# Patient Record
Sex: FEMALE | Race: WHITE | NOT HISPANIC OR LATINO | URBAN - METROPOLITAN AREA
[De-identification: names, ages, dates, MRNs, and addresses within clinical notes are randomized per-mention and may not be internally consistent; named-entity substitution may affect disease eponyms.]

---

## 2017-09-14 ENCOUNTER — IMPORTED ENCOUNTER (OUTPATIENT)
Dept: URBAN - METROPOLITAN AREA CLINIC 1 | Facility: CLINIC | Age: 77
End: 2017-09-14

## 2017-09-14 PROBLEM — H40.1133: Noted: 2017-09-14

## 2017-09-14 PROBLEM — Z96.1: Noted: 2017-09-14

## 2017-09-14 PROBLEM — H02.423: Noted: 2017-09-14

## 2017-09-14 PROBLEM — H16.143: Noted: 2017-09-14

## 2017-09-14 PROBLEM — H04.123: Noted: 2017-09-14

## 2017-09-14 PROCEDURE — 92012 INTRM OPH EXAM EST PATIENT: CPT

## 2017-09-14 PROCEDURE — 92083 EXTENDED VISUAL FIELD XM: CPT

## 2017-09-14 NOTE — PATIENT DISCUSSION
1.  Adv POAG OU (CD 0.9 OU) with Contact dermatits OU resolved off Alphagan. **Allergic to ALphagan P** Stable IOP OU. HVF defects show no progression OU. Consider ALT if IOP elevates. (H/o Sulfa reaction/ allergy) Cont Timolol 0.5% BID OU Cont Latanoprost QHS OU. 2.  STERLING w/ PEK OU- Cont ATs QID OU Routinely. 3.  Pseudophakia OU - (Standard) 4. Myogenic Ptosis OU -- observe 5. H/o Uveitis OU (H/o MARIE with unknown etiology)- Quiet off drops. 6.  H/o ERM OD7. Return for an appointment for a 30/OCT in April with Dr. Zo Da Silva.

## 2017-09-25 ENCOUNTER — HOSPITAL ENCOUNTER (OUTPATIENT)
Dept: PHYSICAL THERAPY | Age: 77
Discharge: HOME | End: 2017-09-25
Payer: MEDICARE

## 2017-09-25 PROCEDURE — G8979 MOBILITY GOAL STATUS: HCPCS

## 2017-09-25 PROCEDURE — 97162 PT EVAL MOD COMPLEX 30 MIN: CPT

## 2017-09-25 PROCEDURE — G8978 MOBILITY CURRENT STATUS: HCPCS

## 2017-09-25 PROCEDURE — 97110 THERAPEUTIC EXERCISES: CPT

## 2017-09-25 NOTE — PROGRESS NOTES
[address] [facility] [address] [facility] AT [address] [place], [place]  Phone: [phone] Fax: [phone] / [address] [address] PHYSICAL THERAPY SERVICES  Patient Name: Lizy : 1940   Medical   Diagnosis: Unsteadiness on feet [R26.81]  Right knee pain [M25.561] Treatment Diagnosis: R knee pain, gait imbalance    Onset Date: 17     Referral Source: Shay Interiano MD Start of Care [facility]): 2017   Prior Hospitalization: See medical history Provider #: [id number]   Prior Level of Function: Pt notes unsteadiness for several years, decreased gait speed   Comorbidities: Hx of L patellar fx    Medications: Verified on Patient Summary List   The Plan of Care and following information is based on the information from the initial evaluation.   ========================================================================  Assessment / key information: Pt is a 77 yo female who tripped descenting stairs, hurting her head, R toes and R knee. Pt presents with medial and posterior knee knee pain, worse in the morning and better with mobility. Pain ranges from 0-2/10. MD notes Xrays for foot and knee are (-) for fx but + for arthritis. Functional Limitations include long duration standing, walking; walking faster, walking in the dark, I ambulation on stairs; decreased strength leading to confidence issues/walking/exercise, squatting; showering I (uses bath or shower seat). Pt notes furniture walking/significant near falls at home. Palpation reveals TTP distal HS. Gait: is slow, unsteady, guarded; normal mechnics. Knee AROM R 0 to 135; . Ankle AROM DF: 7.  LE MMT: hip flex R 4-/5, abd 4/5 (B), ext 4/5, knee flex 5/5, ext 5/5, ankle 5/5. Patellar mobility is WNL. HS 90/90 R 30, , [place] min + R.  SLS EO 3\". Tandem (L) EO: 30\", EC 3\", R EO: 30, EC 2\". Rhomb EO/EC: 60\" each.  Pt will benefit from PT interventions to address the aforementioned deficits and allow pt to return to PLOF.    ========================================================================  Eval Complexity: History: MEDIUM  Complexity : 1-2 comorbidities / personal factors will impact the outcome/ POC Exam:HIGH Complexity : 4+ Standardized tests and measures addressing body structure, function, activity limitation and / or participation in recreation  Presentation: MEDIUM Complexity : Evolving with changing characteristics  Clinical Decision Making:MEDIUM Complexity : FOTO score of 26-74Overall Complexity:MEDIUM  Problem List: pain affecting function, decrease ROM, decrease strength, impaired gait/ balance, decrease ADL/ functional abilitiies, decrease activity tolerance, decrease flexibility/ joint mobility and decrease transfer abilities   Treatment Plan may include any combination of the following: Therapeutic exercise, Therapeutic activities, Neuromuscular re-education, Physical agent/modality, Gait/balance training, Manual therapy, Patient education, Self Care training, Functional mobility training, Home safety training and Stair training  Patient / Family readiness to learn indicated by: asking questions, trying to perform skills and interest  Persons(s) to be included in education: patient (P)  Barriers to Learning/Limitations: None  Measures taken:    Patient Goal (s): \"get safer\"   Patient self reported health status: good  Rehabilitation Potential: good   Short Term Goals: To be accomplished in  2  treatments:  1. Pt will be independent and compliant with HEP to decrease pain, increase ROM and return pt to PLOF.  Long Term Goals: To be accomplished in  8-10  treatments:  1. Pt will increase score on the FOTO to > or = 64/100 to demo an increase in functional tolerance  2. Pt will decrease time on the TUG to < or = 7\" to improve gait safety  3.  Pt will be able to perform SLS with EO > or = 7\" with normal sway to improve community mobility and curb negotiation  4. Pt will have sufficient LE strength to squat and  a grocery bag from the floor and return to standing without pain or safety concerns. Frequency / Duration:   Patient to be seen  2  times per week for 8-10  treatments:  Patient / Caregiver education and instruction: self care, activity modification and exercises  G-Codes (GP): Mobility [id number] Current  CK= 40-59%   Goal  CJ= 20-39%. The severity rating is based on the FOTO Score    Therapist Signature: Yancy Monaco DPT Date: 8/08/0358   Certification Period: 9/25/17 to 12/23/17 Time: 9:32 AM   ========================================================================  I certify that the above Physical Therapy Services are being furnished while the patient is under my care. I agree with the treatment plan and certify that this therapy is necessary. Physician Signature:        Date:       Time:   Please sign and return to [facility] or you may fax the signed copy to [phone]. Thank you.

## 2017-09-25 NOTE — PROGRESS NOTES
PT DAILY TREATMENT NOTE     Patient Name: Lizy  Date:2017  : 1940  [x]  Patient  Verified  [place]or: [place] MED[place] / Plan: [place] MED[place] & [place] / Product Type: Medicare /    In time:9:33  Out time:10:20  Total Treatment Time (min): 47  Total Timed Codes (min): 15  1:1 Treatment Time (min): 52   Visit #: 1 of 8-10    Treatment Area: Unsteadiness on feet [R26.81]  Right knee pain [M25.561]    SUBJECTIVE  Pain Level (0-10 scale): 0  Any medication changes, allergies to medications, adverse drug reactions, diagnosis change, or new procedure performed?: [x] No    [] Yes (see summary sheet for update)  Subjective functional status/changes:   [] No changes reported  SEE IE for Subjective Information    OBJECTIVE      15 min Therapeutic Exercise:  [x] See flow sheet :   Rationale: increase ROM, increase strength, improve coordination and improve balance to improve the patients ability to improve gait, mobility,           x min Patient Education: [x] Review HEP    [] Progressed/Changed HEP based on:   [] positioning   [] body mechanics   [] transfers   [] heat/ice application        Other Objective/Functional Measures:   Coordination of LEs intact  senation intact      Pain Level (0-10 scale) post treatment: 0    ASSESSMENT/Changes in Function: see IE    Patient will continue to benefit from skilled PT services to modify and progress therapeutic interventions, address functional mobility deficits, address ROM deficits, address strength deficits, analyze and address soft tissue restrictions, analyze and cue movement patterns, analyze and modify body mechanics/ergonomics, assess and modify postural abnormalities and address imbalance/dizziness to attain remaining goals.      [x]  See Plan of Care  []  See progress note/recertification  []  See Discharge Summary         Progress towards goals / Updated goals:  SEE IE FOR GOALS     PLAN  []  Upgrade activities as tolerated     []  Continue plan of care  []  Update interventions per flow sheet       []  Discharge due to:_  [x]  Other:Initiate POC as stated in the IE      Justification for Eval Code Complexity:  Patient History : fear avoidance, chronic decline of balance  Examination balance, TUG 9\", CROFT 18/30, decreased balance, decreased strength   Clinical Presentation: evolving with changing  Clinical Decision Making : BETTINA 54/100     Danilo Barrett DPT 9/25/2017  9:32 AM

## 2017-09-29 ENCOUNTER — HOSPITAL ENCOUNTER (OUTPATIENT)
Dept: PHYSICAL THERAPY | Age: 77
Discharge: HOME | End: 2017-09-29
Payer: MEDICARE

## 2017-09-29 PROCEDURE — 97110 THERAPEUTIC EXERCISES: CPT

## 2017-09-29 PROCEDURE — 97116 GAIT TRAINING THERAPY: CPT

## 2017-09-29 NOTE — PROGRESS NOTES
PT DAILY TREATMENT NOTE     Patient Name: Lizy  Date:2017  : 1940  [x]  Patient  Verified  Payor: [employer]u [employer] / Plan: [place] MED[place] PART [place] & B / Product Type: Medicare /    In time:722  Out time:803  Total Treatment Time (min): 41  Total Timed Codes (min): 41  1:1 Treatment Time (min): 41   Visit #: 2 of 8-10    Treatment Area: Unsteadiness on feet [R26.81]  Right knee pain [M25.561]    SUBJECTIVE  Pain Level (0-10 scale): 0  Any medication changes, allergies to medications, adverse drug reactions, diagnosis change, or new procedure performed?: [x] No    [] Yes (see summary sheet for update)  Subjective functional status/changes:   [] No changes reported  \"Doing ok. Its early. \"    OBJECTIVE  Modality rationale: NI today    Min Type Additional Details    [] Estim: []Att   []Unatt        []TENS instruct                  []IFC  []Premod   []NMES                     []Other:  []w/US   []w/ice   []w/heat  Position:  Location:    []  Traction: [] Cervical       []Lumbar                       [] Prone          []Supine                       []Intermittent   []Continuous Lbs:  [] before manual  [] after manual    []  Ultrasound: []Continuous   [] Pulsed                           []1MHz   []3MHz Location:  W/cm2:    []  Iontophoresis with dexamethasone         Location: [] Take home patch   [] In clinic    []  Ice     []  heat  []  Ice massage Position:  Location:    []  Vasopneumatic Device Pressure:       [] lo [] med [] hi   Temperature: [] lo [] med [] hi   [x] Skin assessment post-treatment:  [x]intact []redness- no adverse reaction       []redness  adverse reaction:       26 min Therapeutic Exercise:  [x] See flow sheet :Initiated ther ex per flow sheet    Rationale: increase ROM, increase strength, improve coordination, improve balance and decrease gait deviations to improve the patients ability to improve confidence with gait and balance to decrease fall risk     15 min Gait training; EC, retro, side step and tandem in ll bars   Rationale: improve  balance to decrease fall risk in the home and community     x min Patient Education: [x] Review HEP from IE        Other Objective/Functional Measures: Therex initiated today - first FU post eval   EC gait deviation approx 1.5 ft to R after 20 ft x 2    Pain Level (0-10 scale) post treatment: 0    ASSESSMENT/Changes in Function: Patient tolerated initiation of therex well. 100% VC for form and technique for all newly introduced therex. SBA for balance challenges. Most unsteady with EC, HT and nods, but improved with practice. Unable to raheem SR EC, therefore mod to bunion with EC. Patient will continue to benefit from skilled PT services to modify and progress therapeutic interventions, address functional mobility deficits, address ROM deficits, address strength deficits, analyze and address soft tissue restrictions, analyze and cue movement patterns, analyze and modify body mechanics/ergonomics and assess and modify postural abnormalities to attain remaining goals.      [x]  See Plan of Care  []  See progress note/recertification  []  See Discharge Summary         Progress towards goals / Updated goals:  FIRST FU TREATMENT - CONT PER POT TO EST GOALS 9/29/2017    PLAN  [x]  Upgrade activities as tolerated     []  Continue plan of care  [x]  Update interventions per flow sheet       []  Discharge due to:_  [x]  Other:_assess response to first FU treatment     Progress side step to [id number], PT 9/29/2017

## 2017-10-03 ENCOUNTER — HOSPITAL ENCOUNTER (OUTPATIENT)
Dept: PHYSICAL THERAPY | Age: 77
Discharge: HOME | End: 2017-10-03
Payer: MEDICARE

## 2017-10-03 PROCEDURE — 97116 GAIT TRAINING THERAPY: CPT

## 2017-10-03 PROCEDURE — 97110 THERAPEUTIC EXERCISES: CPT

## 2017-10-03 NOTE — PROGRESS NOTES
PT DAILY TREATMENT NOTE     Patient Name: Lizy  Date:10/3/2017  : 1940  [x]  Patient  Verified  Payor: [place] MEDICARE / Plan: [place] MEDICARE PART A & B / Product Type: Medicare /    In time: 8:30am      Out time: 9:11am  Total Treatment Time (min): 41  Total Timed Codes (min): 41  1:1 Treatment Time (min): 30  Visit #: 3 of 8-10    Treatment Area: Unsteadiness on feet [R26.81]  Right knee pain [M25.561]    SUBJECTIVE  Pain Level (0-10 scale): 0  Any medication changes, allergies to medications, adverse drug reactions, diagnosis change, or new procedure performed?: [x] No    [] Yes (see summary sheet for update)  Subjective functional status/changes:   [] No changes reported  Patient notes first f/u treatment did not produce any adverse reaction. \"My knee really only hurts when I go up the stairs. \"    OBJECTIVE  Modality rationale: NI today    Min Type Additional Details    [] Estim: []Att   []Unatt        []TENS instruct                  []IFC  []Premod   []NMES                     []Other:  []w/US   []w/ice   []w/heat  Position:  Location:    []  Traction: [] Cervical       []Lumbar                       [] Prone          []Supine                       []Intermittent   []Continuous Lbs:  [] before manual  [] after manual    []  Ultrasound: []Continuous   [] Pulsed                           []1MHz   []3MHz Location:  W/cm2:    []  Iontophoresis with dexamethasone         Location: [] Take home patch   [] In clinic    []  Ice     []  heat  []  Ice massage Position:  Location:    []  Vasopneumatic Device Pressure:       [] lo [] med [] hi   Temperature: [] lo [] med [] hi   [x] Skin assessment post-treatment:  [x]intact []redness- no adverse reaction       []redness  adverse reaction:     23 min Therapeutic Exercise:  [x] See flow sheet: added DL leg press   Rationale: increase ROM, increase strength, improve coordination, improve balance and decrease gait deviations to improve the patients ability to improve confidence with gait and balance to decrease fall risk     18 min Gait training: EC, retro, carioca and tandem in clinic, gait belt used for safety, 2 x 30 feet each, SBA req   Rationale: improve  balance to decrease fall risk in the home and community     X min Patient Education: [x] Review HEP - progress to MSR GT EO, add std hip ABD with RTB      Other Objective/Functional Measures:      Pain Level (0-10 scale) post treatment: 0    ASSESSMENT/Changes in Function:   Good tolerance to treatment today. Dynamic balance improved as pt notes ability to maintain level head and demo GT challenges with only 2 LOB during tandem only. Patient will continue to benefit from skilled PT services to modify and progress therapeutic interventions, address functional mobility deficits, address ROM deficits, address strength deficits, analyze and address soft tissue restrictions, analyze and cue movement patterns, analyze and modify body mechanics/ergonomics and assess and modify postural abnormalities to attain remaining goals. [x]  See Plan of Care  []  See progress note/recertification  []  See Discharge Summary         Progress towards goals / Updated goals:  Short Term Goals: To be accomplished in  2  treatments:  1. Pt will be independent and compliant with HEP to decrease pain, increase ROM and return pt to PLOF. Long Term Goals: To be accomplished in  8-10  treatments:  1. Pt will increase score on the FOTO to > or = 64/100 to demo an increase in functional tolerance  2. Pt will decrease time on the TUG to < or = 7\" to improve gait safety  3. Pt will be able to perform SLS with EO > or = 7\" with normal sway to improve community mobility and curb negotiation  4. Pt will have sufficient LE strength to squat and  a grocery bag from the floor and return to standing without pain or safety concerns.      PLAN  [x]  Upgrade activities as tolerated     [x]  Continue plan of care  [x]  Update interventions per flow sheet       []  Discharge due to:_  []  Other:_    Estefania Alberto PTA 10/3/2017

## 2017-10-05 NOTE — PROGRESS NOTES
PT DAILY TREATMENT NOTE     Patient Name: Lizy  Date:10/5/2017  : 1940  [x]  Patient  Verified  Payor: Harika Landaverde / Plan: [place] MEDICARE PART A & B / Product Type: Medicare /    In time: 9:31am      Out time: 10:12am  Total Treatment Time (min): 41  Total Timed Codes (min): 41  1:1 Treatment Time (min): 38  Visit #: 4 of 8-10    Treatment Area: Unsteadiness on feet [R26.81]  Right knee pain [M25.561]    SUBJECTIVE  Pain Level (0-10 scale): 0  Any medication changes, allergies to medications, adverse drug reactions, diagnosis change, or new procedure performed?: [x] No    [] Yes (see summary sheet for update)  Subjective functional status/changes:   [] No changes reported  Pt notes near falls daily. \"I walked yesterday over a mile. I have to stare at the floor when I walk so I don't fall. \"    OBJECTIVE  Modality rationale: NT   Min Type Additional Details    [] Estim: []Att   []Unatt        []TENS instruct                  []IFC  []Premod   []NMES                     []Other:  []w/US   []w/ice   []w/heat  Position:  Location:    []  Traction: [] Cervical       []Lumbar                       [] Prone          []Supine                       []Intermittent   []Continuous Lbs:  [] before manual  [] after manual    []  Ultrasound: []Continuous   [] Pulsed                           []1MHz   []3MHz Location:  W/cm2:    []  Iontophoresis with dexamethasone         Location: [] Take home patch   [] In clinic    []  Ice     []  heat  []  Ice massage Position:   Location:     []  Vasopneumatic Device Pressure:       [] lo [] med [] hi   Temperature: [] lo [] med [] hi   [x] Skin assessment post-treatment:  [x]intact []redness- no adverse reaction       []redness  adverse reaction:     25 min Therapeutic Exercise:  [x] See flow sheet:   Rationale: increase ROM, increase strength, improve coordination, improve balance and decrease gait deviations to improve the patients ability to improve confidence with gait and balance to decrease fall risk     16 min Gait training: EC, retro, carioca and tandem in clinic, gait belt used for safety, 2 x 30 feet each, SBA req; outdoor amb on grass, on/off curb, over parking block, on mulch, slanted sidewalk x 300 feet   Rationale: improve  balance to decrease fall risk in the home and community     X min Patient Education: [x] Review HEP     Other Objective/Functional Measures:      Pain Level (0-10 scale) post treatment: 0    ASSESSMENT/Changes in Function:   Anterior/flex hip strategy beginning to develop for posterior perturbations/challenges and pt notes this is her greatest challenge. Patient notes no falls since last session, but residual pain along distal quads - PTA and pt determined DOMS versus actual pain. Pt able to demo safe community amb, but notes staring at floor due to lack of confidence with balance. Patient will continue to benefit from skilled PT services to modify and progress therapeutic interventions, address functional mobility deficits, address ROM deficits, address strength deficits, analyze and address soft tissue restrictions, analyze and cue movement patterns, analyze and modify body mechanics/ergonomics and assess and modify postural abnormalities to attain remaining goals. [x]  See Plan of Care  []  See progress note/recertification  []  See Discharge Summary         Progress towards goals / Updated goals:  Short Term Goals: To be accomplished in  2  treatments:  1. Pt will be independent and compliant with HEP to decrease pain, increase ROM and return pt to PLOF. Long Term Goals: To be accomplished in  8-10  treatments:  1. Pt will increase score on the FOTO to > or = 64/100 to demo an increase in functional tolerance  2. Pt will decrease time on the TUG to < or = 7\" to improve gait safety  3. Pt will be able to perform SLS with EO > or = 7\" with normal sway to improve community mobility and curb negotiation  4.  Pt will have sufficient LE strength to squat and  a grocery bag from the floor and return to standing without pain or safety concerns.      PLAN  [x]  Upgrade activities as tolerated     [x]  Continue plan of care  []  Update interventions per flow sheet       []  Discharge due to:_  [x]  Other: progress strengthening NV    Lydia Sport, PTA 10/6/2017

## 2017-10-06 ENCOUNTER — HOSPITAL ENCOUNTER (OUTPATIENT)
Dept: PHYSICAL THERAPY | Age: 77
Discharge: HOME | End: 2017-10-06
Payer: MEDICARE

## 2017-10-06 PROCEDURE — 97112 NEUROMUSCULAR REEDUCATION: CPT

## 2017-10-06 PROCEDURE — 97110 THERAPEUTIC EXERCISES: CPT

## 2017-10-10 ENCOUNTER — APPOINTMENT (OUTPATIENT)
Dept: PHYSICAL THERAPY | Age: 77
End: 2017-10-10
Payer: MEDICARE

## 2017-10-11 ENCOUNTER — HOSPITAL ENCOUNTER (OUTPATIENT)
Dept: PHYSICAL THERAPY | Age: 77
Discharge: HOME | End: 2017-10-11
Payer: MEDICARE

## 2017-10-11 PROCEDURE — 97116 GAIT TRAINING THERAPY: CPT

## 2017-10-11 PROCEDURE — 97110 THERAPEUTIC EXERCISES: CPT

## 2017-10-11 NOTE — PROGRESS NOTES
PT DAILY TREATMENT NOTE     Patient Name: Lizy  Date:10/11/2017  : 1940  [x]  Patient  Verified  Payor: Fam Chirinos / Plan: [place] / Product Type: Medicare /    In time: 109   Out time: 1257  Total Treatment Time (min): 48  Total Timed Codes (min): 48  1:1 Treatment Time (min): 48  Visit #: 5 of 8-10    Treatment Area: Unsteadiness on feet [R26.81]  Right knee pain [M25.561]    SUBJECTIVE  Pain Level (0-10 scale): 0  Any medication changes, allergies to medications, adverse drug reactions, diagnosis change, or new procedure performed?: [x] No    [] Yes (see summary sheet for update)  Subjective functional status/changes:   [] No changes reported  \"I have some tightness in my knee from a previous injury (hx patellar fx) that I think throws me off sometimes. I think its scar tissue though. \"    OBJECTIVE  Modality rationale: NT   Min Type Additional Details    [] Estim: []Att   []Unatt        []TENS instruct                  []IFC  []Premod   []NMES                     []Other:  []w/US   []w/ice   []w/heat  Position:  Location:    []  Traction: [] Cervical       []Lumbar                       [] Prone          []Supine                       []Intermittent   []Continuous Lbs:  [] before manual  [] after manual    []  Ultrasound: []Continuous   [] Pulsed                           []1MHz   []3MHz Location:  W/cm2:    []  Iontophoresis with dexamethasone         Location: [] Take home patch   [] In clinic    []  Ice     []  heat  []  Ice massage Position:   Location:     []  Vasopneumatic Device Pressure:       [] lo [] med [] hi   Temperature: [] lo [] med [] hi   [x] Skin assessment post-treatment:  [x]intact []redness- no adverse reaction       []redness  adverse reaction:     35 min Therapeutic Exercise:  [x] See flow sheet:   Rationale: increase ROM, increase strength, improve coordination, improve balance and decrease gait deviations to improve the patients ability to improve confidence with gait and balance to decrease fall risk     13 min Gait training: EC, retro, carioca and modified tandem in clinic, gait belt used for safety, 2 x 30 feet each, TUG reassessment, SBA req only for mod tandem , outdoor amb on grass, on/off curb, over parking block x 6 , ,sidewalk x 300 feet   Rationale: improve  balance to decrease fall risk in the home and community     X min Patient Education: [x] Review HEP     Other Objective/Functional Measures:     STG 1 HEP compliance : reports compliance     LTG 2 - TUG trial 1 9 sec, trail 2 8 sec, trail 3 7 sec    Added minisquats and fwd/ lateral step ups to address LE strength deficits     Pain Level (0-10 scale) post treatment: 0    ASSESSMENT/Changes in Function: Patient tolerated new therex well and is progressing to goals within reason. TUG time improved with 3 trials. Improved flexibility indicated by increased step ht on HS stretch on stairs. Also improved deviation with amb with EC. Patient will continue to benefit from skilled PT services to modify and progress therapeutic interventions, address functional mobility deficits, address ROM deficits, address strength deficits, analyze and address soft tissue restrictions, analyze and cue movement patterns, analyze and modify body mechanics/ergonomics and assess and modify postural abnormalities to attain remaining goals. [x]  See Plan of Care  []  See progress note/recertification  []  See Discharge Summary         Progress towards goals / Updated goals:  Short Term Goals: To be accomplished in  2  treatments:  1. Pt will be independent and compliant with HEP to decrease pain, increase ROM and return pt to PLOF. Goal progressing - HEP est with no questions. HEP will be modified and progressed as appropriate 10/11/17  Long Term Goals: To be accomplished in  8-10  treatments:  1. Pt will increase score on the FOTO to > or = 64/100 to demo an incre ase in functional tolerance  2.  Pt will decrease time on the TUG to < or = 7\" to improve gait safety goal met at 7 sec on 3rd trial  10/11/17  3. Pt will be able to perform SLS with EO > or = 7\" with normal sway to improve community mobility and curb negotiation  4. Pt will have sufficient LE strength to squat and  a grocery bag from the floor and return to standing without pain or safety concerns.      PLAN  [x]  Upgrade activities as tolerated     [x]  Continue plan of care  []  Update interventions per flow sheet       []  Discharge due to:_  [x]  Other: assess response to progression of LE strengthening   Add quad stretch for ant knee mobility     Johnnie Miugel, PT 10/11/2017

## 2017-10-13 ENCOUNTER — HOSPITAL ENCOUNTER (OUTPATIENT)
Dept: PHYSICAL THERAPY | Age: 77
Discharge: HOME | End: 2017-10-13
Payer: MEDICARE

## 2017-10-13 PROCEDURE — 97140 MANUAL THERAPY 1/> REGIONS: CPT

## 2017-10-13 PROCEDURE — 97116 GAIT TRAINING THERAPY: CPT

## 2017-10-13 PROCEDURE — 97110 THERAPEUTIC EXERCISES: CPT

## 2017-10-13 NOTE — PROGRESS NOTES
PT DAILY TREATMENT NOTE     Patient Name: Lizy  Date:10/13/2017  : 1940  [x]  Patient  Verified  Payor: Geetanoam Perish / Plan: [place] MEDIC[place] / Product Type: Medicare /    In time: 9:31am      Out time: 10:20am  Total Treatment Time (min): 49  Total Timed Codes (min): 49  1:1 Treatment Time (min): 40  Visit #: 6 of 8-10    Treatment Area: Unsteadiness on feet [R26.81]  Right knee pain [M25.561]    SUBJECTIVE  Pain Level (0-10 scale): 0  Any medication changes, allergies to medications, adverse drug reactions, diagnosis change, or new procedure performed?: [x] No    [] Yes (see summary sheet for update)  Subjective functional status/changes:   [] No changes reported  \"I feel like my feet are the underside of a boat. \"    OBJECTIVE  Modality rationale: NT   Min Type Additional Details    [] Estim: []Att   []Unatt        []TENS instruct                  []IFC  []Premod   []NMES                     []Other:  []w/US   []w/ice   []w/heat  Position:  Location:    []  Traction: [] Cervical       []Lumbar                       [] Prone          []Supine                       []Intermittent   []Continuous Lbs:  [] before manual  [] after manual    []  Ultrasound: []Continuous   [] Pulsed                           []1MHz   []3MHz Location:  W/cm2:    []  Iontophoresis with dexamethasone         Location: [] Take home patch   [] In clinic    []  Ice     []  heat  []  Ice massage Position:   Location:     []  Vasopneumatic Device Pressure:       [] lo [] med [] hi   Temperature: [] lo [] med [] hi   [x] Skin assessment post-treatment:  [x]intact []redness- no adverse reaction       []redness  adverse reaction:     35 min Therapeutic Exercise:  [x] See flow sheet: added quad stretch   Rationale: increase ROM, increase strength, improve coordination, improve balance and decrease gait deviations to improve the patients ability to improve confidence with gait and balance to decrease fall risk     14 min Gait training: EC, retro, carioca, HHT, VHT, and modified tandem in clinic, gait belt used for safety, 2 x 40 feet each, unable to perform outdoor amb sec poor weather conditions   Rationale: improve  balance to decrease fall risk in the home and community     X min Patient Education: [x] Review HEP - add SLR and prone quad stretch with belt     Other Objective/Functional Measures:     SLS: (L) 9 seconds with inc hip sway, (R) 10 second, normal sway    Pain Level (0-10 scale) post treatment: 0    ASSESSMENT/Changes in Function:   Patient making good progress towards goals. Discussed heel/toe foot placement with amb to improve stability with pt expressing intent to be cognizant of foot positioning while amb. Patient will continue to benefit from skilled PT services to modify and progress therapeutic interventions, address functional mobility deficits, address ROM deficits, address strength deficits, analyze and address soft tissue restrictions, analyze and cue movement patterns, analyze and modify body mechanics/ergonomics and assess and modify postural abnormalities to attain remaining goals. [x]  See Plan of Care  []  See progress note/recertification  []  See Discharge Summary         Progress towards goals / Updated goals:  Short Term Goals: To be accomplished in  2  treatments:  1. Pt will be independent and compliant with HEP to decrease pain, increase ROM and return pt to PLOF. Goal progressing - HEP est with no questions. HEP will be modified and progressed as appropriate 10/11/17  Long Term Goals: To be accomplished in  8-10  treatments:  1. Pt will increase score on the FOTO to > or = 64/100 to demo an incre ase in functional tolerance  2. Pt will decrease time on the TUG to < or = 7\" to improve gait safety goal met at 7 sec on 3rd trial  10/11/17  3.  Pt will be able to perform SLS with EO > or = 7\" with normal sway to improve community mobility and curb negotiation. -Goal progressing, (L) 9 sec, (R) 10 sec (10/13/17)  4. Pt will have sufficient LE strength to squat and  a grocery bag from the floor and return to standing without pain or safety concerns.   -Goal progressing; good form with minisquats (10/13/17)    PLAN  [x]  Upgrade activities as tolerated     [x]  Continue plan of care  []  Update interventions per flow sheet       []  Discharge due to:_  []  Other:_    Kaylyn Guerra PTA 10/13/2017

## 2017-10-17 ENCOUNTER — HOSPITAL ENCOUNTER (OUTPATIENT)
Dept: PHYSICAL THERAPY | Age: 77
Discharge: HOME | End: 2017-10-17
Payer: MEDICARE

## 2017-10-17 PROCEDURE — 97116 GAIT TRAINING THERAPY: CPT | Performed by: PHYSICAL THERAPIST

## 2017-10-17 PROCEDURE — 97110 THERAPEUTIC EXERCISES: CPT | Performed by: PHYSICAL THERAPIST

## 2017-10-17 NOTE — PROGRESS NOTES
PT DAILY TREATMENT NOTE     Patient Name: Lizy  Date:10/17/2017  : 1940  [x]  Patient  Verified  Payor: [place] MEDICARE / Plan: [place] MEDICARE PART A & B / Product Type: Medicare /    In time: 12;23 am      Out time: 103pm  Total Treatment Time (min): 40  Total Timed Codes (min): 35  1:1 Treatment Time (min): 30  Visit #: 7 of 8-10    Treatment Area: Unsteadiness on feet [R26.81]  Right knee pain [M25.561]    SUBJECTIVE  Pain Level (0-10 scale): 0  Any medication changes, allergies to medications, adverse drug reactions, diagnosis change, or new procedure performed?: [x] No    [] Yes (see summary sheet for update)  Subjective functional status/changes:   [] No changes reported  \"I though my apt was at 12:30, I am so sorry \"    OBJECTIVE  Modality rationale: NT   Min Type Additional Details    [] Estim: []Att   []Unatt        []TENS instruct                  []IFC  []Premod   []NMES                     []Other:  []w/US   []w/ice   []w/heat  Position:  Location:    []  Traction: [] Cervical       []Lumbar                       [] Prone          []Supine                       []Intermittent   []Continuous Lbs:  [] before manual  [] after manual    []  Ultrasound: []Continuous   [] Pulsed                           []1MHz   []3MHz Location:  W/cm2:    []  Iontophoresis with dexamethasone         Location: [] Take home patch   [] In clinic    []  Ice     []  heat  []  Ice massage Position:   Location:     []  Vasopneumatic Device Pressure:       [] lo [] med [] hi   Temperature: [] lo [] med [] hi   [x] Skin assessment post-treatment:  [x]intact []redness- no adverse reaction       []redness  adverse reaction:      min Therapeutic Exercise:  [x] See flow sheet: added quad stretch   Rationale: increase ROM, increase strength, improve coordination, improve balance and decrease gait deviations to improve the patients ability to improve confidence with gait and balance to decrease fall risk     40/30 min Gait training: EC, retro, carioca, HHT, VHT, and modified tandem in clinic, gait belt used for safety, 2 x 40 feet each, unable to perform outdoor amb sec poor weather conditions   Rationale: improve  balance to decrease fall risk in the home and community     X min Patient Education: [x] Review HEP - add SLR and prone quad stretch with belt     Other Objective/Functional Measures:     SLS: (L)  22seconds with inc hip sway, (R) 30 second, normal sway : significant improvement   Squat:  4 box lifts with proper posture and no LOB- but challenged with keeping back straight, Added foam high knee marching for incr. SL stability,  Side ambulation for hip Abd strengthening,  Gait training with close hurdles for SL stability, good balance noted with braiding, and improvements in ambulation with EC staying on target. Pain Level (0-10 scale) post treatment: 0    ASSESSMENT/Changes in Function:   Patient making good progress towards goals. Challenged with foam high knee march with decreased SL stability noted. Added corner High knees to HEP, Decreased SL stability noted with close hurdles. Patient arrived late so treatment cut short. Patient will continue to benefit from skilled PT services to modify and progress therapeutic interventions, address functional mobility deficits, address ROM deficits, address strength deficits, analyze and address soft tissue restrictions, analyze and cue movement patterns, analyze and modify body mechanics/ergonomics and assess and modify postural abnormalities to attain remaining goals. [x]  See Plan of Care  []  See progress note/recertification  []  See Discharge Summary         Progress towards goals / Updated goals:  Short Term Goals: To be accomplished in  2  treatments:  1. Pt will be independent and compliant with HEP to decrease pain, increase ROM and return pt to PLOF. Goal progressing - HEP est with no questions.   HEP will be modified and progressed as appropriate 10/11/17  Long Term Goals: To be accomplished in  8-10  treatments:  1. Pt will increase score on the FOTO to > or = 64/100 to demo an incre ase in functional tolerance  2. Pt will decrease time on the TUG to < or = 7\" to improve gait safety goal met at 7 sec on 3rd trial  10/11/17  3. Pt will be able to perform SLS with EO > or = 7\" with normal sway to improve community mobility and curb negotiation. -Goal progressing, (L) 9 sec, (R) 10 sec (10/13/17)  4. Pt will have sufficient LE strength to squat and  a grocery bag from the floor and return to standing without pain or safety concerns. -Goal progressing: patient able to lift box 4x with good form and no LOB.  10-17-17    PLAN  [x]  Upgrade activities as tolerated     [x]  Continue plan of care  []  Update interventions per flow sheet       []  Discharge due to:_  []  Other:_    Anirudh Puckett, PT 10/17/2017

## 2017-10-19 NOTE — PROGRESS NOTES
PT DAILY TREATMENT NOTE     Patient Name: Lizy  Date:10/19/2017  : 1940  [x]  Patient  Verified  Payor: Diane Ice / Plan: [place] MEDICARE PART A [place] B / Product Type: Medicare /    In time: 9:30am      Out time: 10:06am  Total Treatment Time (min): 36  Total Timed Codes (min): 36  1:1 Treatment Time (min): 30  Visit #: 8 of 8-10    Treatment Area: Unsteadiness on feet [R26.81]  Right knee pain [M25.561]    SUBJECTIVE  Pain Level (0-10 scale): 0  Any medication changes, allergies to medications, adverse drug reactions, diagnosis change, or new procedure performed?: [x] No    [] Yes (see summary sheet for update)  Subjective functional status/changes:   [] No changes reported  \"I have been good. The balance is getting better, and I think the knee I bumped is completely fine now. I have some tightness in my left knee, but that's always there. \"    OBJECTIVE  Modality rationale: NT   Min Type Additional Details    [] Estim: []Att   []Unatt        []TENS instruct                  []IFC  []Premod   []NMES                     []Other:  []w/US   []w/ice   []w/heat  Position:  Location:    []  Traction: [] Cervical       []Lumbar                       [] Prone          []Supine                       []Intermittent   []Continuous Lbs:  [] before manual  [] after manual    []  Ultrasound: []Continuous   [] Pulsed                           []1MHz   []3MHz Location:  W/cm2:    []  Iontophoresis with dexamethasone         Location: [] Take home patch   [] In clinic    []  Ice     []  heat  []  Ice massage Position:   Location:     []  Vasopneumatic Device Pressure:       [] lo [] med [] hi   Temperature: [] lo [] med [] hi   [x] Skin assessment post-treatment:  [x]intact []redness- no adverse reaction       []redness  adverse reaction:     28 min Therapeutic Exercise:  [x] See flow sheet:    Rationale: increase ROM, increase strength, improve coordination, improve balance and decrease gait deviations to improve the patients ability to improve confidence with gait and balance to decrease fall risk     8 min Gait training: EC, retro, carioca, high knees, 2 x 40 feet each, outdoor amb on grass figure 8, on mulch, off/onto curb, above very low shrubs, over parking block)   Rationale: improve  balance to decrease fall risk in the home and community     X min Patient Education: [x] Review HEP     Other Objective/Functional Measures:     Foam SLS: (R) 26s, (L) 4s     Pain Level (0-10 scale) post treatment: 0    ASSESSMENT/Changes in Function:   Good tolerance to crate lifts with good form and no inc in back pain; min VCs for inc hip flexion and post weight shift to prevent knee flexion over the toes (ant patellar shear). Patient will continue to benefit from skilled PT services to modify and progress therapeutic interventions, address functional mobility deficits, address ROM deficits, address strength deficits, analyze and address soft tissue restrictions, analyze and cue movement patterns, analyze and modify body mechanics/ergonomics and assess and modify postural abnormalities to attain remaining goals. [x]  See Plan of Care  []  See progress note/recertification  []  See Discharge Summary         Progress towards goals / Updated goals:  Short Term Goals: To be accomplished in  2  treatments:  1. Pt will be independent and compliant with HEP to decrease pain, increase ROM and return pt to PLOF. Goal progressing - HEP est with no questions. HEP will be modified and progressed as appropriate 10/11/17  Long Term Goals: To be accomplished in  8-10  treatments:  1. Pt will increase score on the FOTO to > or = 64/100 to demo an incre ase in functional tolerance  2. Pt will decrease time on the TUG to < or = 7\" to improve gait safety goal met at 7 sec on 3rd trial  10/11/17  3.  Pt will be able to perform SLS with EO > or = 7\" with normal sway to improve community mobility and curb negotiation. -Goal progressing, (L) 22s, (R) 30s with normal sway (10/17/17)  4. Pt will have sufficient LE strength to squat and  a grocery bag from the floor and return to standing without pain or safety concerns. -Goal progressing: patient able to lift box 4x with good form and no LOB.  10-17-17 - good carryover from last session (10/20/17)    PLAN  [x]  Upgrade activities as tolerated     [x]  Continue plan of care  []  Update interventions per flow sheet       []  Discharge due to:_  [x]  Other: pt due for 30-day reassessment ANIRUDH Lomas Or, PTA 10/20/2017

## 2017-10-20 ENCOUNTER — HOSPITAL ENCOUNTER (OUTPATIENT)
Dept: PHYSICAL THERAPY | Age: 77
Discharge: HOME | End: 2017-10-20
Payer: MEDICARE

## 2017-10-20 PROCEDURE — 97110 THERAPEUTIC EXERCISES: CPT

## 2017-10-20 PROCEDURE — 97116 GAIT TRAINING THERAPY: CPT

## 2017-10-24 ENCOUNTER — HOSPITAL ENCOUNTER (OUTPATIENT)
Dept: PHYSICAL THERAPY | Age: 77
Discharge: HOME | End: 2017-10-24
Payer: MEDICARE

## 2017-10-24 PROCEDURE — G8979 MOBILITY GOAL STATUS: HCPCS | Performed by: PHYSICAL THERAPIST

## 2017-10-24 PROCEDURE — G8980 MOBILITY D/C STATUS: HCPCS | Performed by: PHYSICAL THERAPIST

## 2017-10-24 PROCEDURE — 97530 THERAPEUTIC ACTIVITIES: CPT | Performed by: PHYSICAL THERAPIST

## 2017-10-24 NOTE — PROGRESS NOTES
PT DAILY TREATMENT NOTE     Patient Name: Lizy  Date:10/24/2017  : 1940  [x]  Patient  Verified  Payor: Ann-Marie Amador / Plan: [place] MEDICARE PART A [place] B / Product Type: Medicare /    In time: 154pm      Out time: 220pm  Total Treatment Time (min): 26  Total Timed Codes (min): 26  1:1 Treatment Time (min): 26  Visit #: 8 of 8-10    Treatment Area: Unsteadiness on feet [R26.81]  Right knee pain [M25.561]    SUBJECTIVE  Pain Level (0-10 scale): 0  Any medication changes, allergies to medications, adverse drug reactions, diagnosis change, or new procedure performed?: [x] No    [] Yes (see summary sheet for update)  Subjective functional status/changes:   [] No changes reported.     OBJECTIVE  Modality rationale: NT   Min Type Additional Details    [] Estim: []Att   []Unatt        []TENS instruct                  []IFC  []Premod   []NMES                     []Other:  []w/US   []w/ice   []w/heat  Position:  Location:    []  Traction: [] Cervical       []Lumbar                       [] Prone          []Supine                       []Intermittent   []Continuous Lbs:  [] before manual  [] after manual    []  Ultrasound: []Continuous   [] Pulsed                           []1MHz   []3MHz Location:  W/cm2:    []  Iontophoresis with dexamethasone         Location: [] Take home patch   [] In clinic    []  Ice     []  heat  []  Ice massage Position:   Location:     []  Vasopneumatic Device Pressure:       [] lo [] med [] hi   Temperature: [] lo [] med [] hi   [x] Skin assessment post-treatment:  [x]intact []redness- no adverse reaction       []redness  adverse reaction:     26 min Therapeutic activitiy:  [x] See flow sheet: performed DC assessment, and FOTO with patient   Rationale: increase ROM, increase strength, improve coordination, improve balance and decrease gait deviations to improve the patients ability to improve confidence with gait and balance to decrease fall risk      min Gait training: EC, retro, carioca, high knees, 2 x 40 feet each, outdoor amb on grass figure 8, on mulch, off/onto curb, above very low shrubs, over parking block)   Rationale: improve  balance to decrease fall risk in the home and community     X min Patient Education: [x] Review HEP     Other Objective/Functional Measures:     See DC summary     Pain Level (0-10 scale) post treatment: 0    ASSESSMENT/Changes in Function:   See DC summary    Patient will continue to benefit from skilled PT services to modify and progress therapeutic interventions, address functional mobility deficits, address ROM deficits, address strength deficits, analyze and address soft tissue restrictions, analyze and cue movement patterns, analyze and modify body mechanics/ergonomics and assess and modify postural abnormalities to attain remaining goals. []  See Plan of Care  []  See progress note/recertification  [x]  See Discharge Summary         Progress towards goals / Updated goals:  Short Term Goals: To be accomplished in  2  treatments:  1. Pt will be independent and compliant with HEP to decrease pain, increase ROM and return pt to PLOF. Goal progressing - HEP est with no questions. HEP will be modified and progressed as appropriate 10/11/17  Long Term Goals: To be accomplished in  8-10  treatments:  1. Pt will increase score on the FOTO to > or = 64/100 to demo an incre ase in functional tolerance  2. Pt will decrease time on the TUG to < or = 7\" to improve gait safety goal met at 7 sec on 3rd trial  10/11/17  3. Pt will be able to perform SLS with EO > or = 7\" with normal sway to improve community mobility and curb negotiation. -Goal progressing, (L) 22s, (R) 30s with normal sway (10/17/17)  4. Pt will have sufficient LE strength to squat and  a grocery bag from the floor and return to standing without pain or safety concerns. -Goal progressing: patient able to lift box 4x with good form and no LOB.  10-17-17 - good carryover from last session (10/20/17)    PLAN  [x]  Upgrade activities as tolerated     [x]  Continue plan of care  []  Update interventions per flow sheet       [x]  Discharge due to:_all goals met  []  Other:     Og Bustamante, PT 10/24/2017

## 2017-10-24 NOTE — PROGRESS NOTES
[address] [facility] AT [address] [place] [place]  Phone: [phone] Fax: [phone]  NOTE  Patient Name: Meghna Thomas : 1940   Treatment/Medical Diagnosis: Unsteadiness on feet [R26.81]  Right knee pain [M25.561]   Referral Source: Angela Perez MD     Date of Initial Visit: 17 Attended Visits: 9 Missed Visits: 0     SUMMARY OF TREATMENT  Patient seen for a total of 9 visits for unsteadiness with gait and R knee pain. Treatments consist of : Neuromuscular training for balance, gait training, therapeutic exercise and lifting training. CURRENT STATUS    Subjective: Patient reports 95% improvements in sx since [facility]. She reports she attends an exercise class 2x/wk and walks 3x/week with her neighbor. Objective:  Improvements: Patient states she can do all her ALDS better, able to take a tub bath and get in/out easier. , improvements in walking, can get on floor to do exercises and get up with no difficulty. Deficits: \"patient states she can't think of any. FOTO: 85    Progress towards goals / Updated goals:  Short Term Goals: To be accomplished in  2  treatments:  1. Pt will be independent and compliant with HEP to decrease pain, increase ROM and return pt to PLOF. Goal MET - HEP est and patient performing daily with no questions. 10-24-17  Long Term Goals: To be accomplished in  8-10  treatments:  1. Pt will increase score on the FOTO to > or = 64/100 to demo an incre ase in functional tolerance FOTO 85 Goal MEt  2. Pt will decrease time on the TUG to < or = 7\" to improve gait safety goal met at 7 sec on 3rd trial  10/11/17  3. Pt will be able to perform SLS with EO > or = 7\" with normal sway to improve community mobility and curb negotiation. -Goal MET , (L) 22s, (R) 30s with normal sway (10/24/17)  4.  Pt will have sufficient LE strength to squat and  a grocery bag from the floor and return to standing without pain or safety concerns. -Goal progressing: patient able to lift box 4x with good form and no LOB. 10-17-17 - good carryover from last session (10/20/17)      G-Codes: Mobility:   Goal  CJ= 20-39%  D/C  CI= 1-19%. The severity rating is based on the FOTO Score  RECOMMENDATIONS  Patient to be DC to a [facility] as she has met the above listed goals. If you have any questions/comments please contact us directly at [phone]. Thank you for allowing us to assist in the care of your patient. Therapist Signature: Skinny Onofre, PT Date: 84/13/0173   Certification period:   9/25/17 to 12/23/17 Time: 10:53 AM   NOTE TO PHYSICIAN:  PLEASE COMPLETE THE ORDERS BELOW AND FAX TO   [facility]: [phone]. If you are unable to process this request in 24 hours please contact our office: [phone].  ___ I have read the above report and request that my patient continue as recommended.   ___ I have read the above report and request that my patient continue therapy with the following changes/special instructions:_________________________________________________________   ___ I have read the above report and request that my patient be discharged from therapy.      Physician Signature:        Date:       Time:

## 2017-10-27 ENCOUNTER — APPOINTMENT (OUTPATIENT)
Dept: PHYSICAL THERAPY | Age: 77
End: 2017-10-27
Payer: MEDICARE

## 2018-04-17 ENCOUNTER — IMPORTED ENCOUNTER (OUTPATIENT)
Dept: URBAN - METROPOLITAN AREA CLINIC 1 | Facility: CLINIC | Age: 78
End: 2018-04-17

## 2018-04-17 PROBLEM — H40.1133: Noted: 2018-04-17

## 2018-04-17 PROBLEM — H16.143: Noted: 2018-04-17

## 2018-04-17 PROBLEM — H04.123: Noted: 2018-04-17

## 2018-04-17 PROCEDURE — 92014 COMPRE OPH EXAM EST PT 1/>: CPT

## 2018-04-17 PROCEDURE — 92133 CPTRZD OPH DX IMG PST SGM ON: CPT

## 2018-04-17 NOTE — PATIENT DISCUSSION
1.  Adv POAG OU (CD 0.9 OU) with Contact dermatitis OU resolved off Alphagan. **Allergic to ALphagan P** Stable IOP OU. OCT shows no progression OD slight progression OS. Consider ALT if IOP elevates. (H/o Sulfa reaction/ allergy) Cont Timolol 0.5% BID OU Cont Latanoprost QHS OU. 2.  STERLING w/ PEK OU- (H/o Cautery LLs OU) Increase to ATs QID OU Routinely. (Pt has a hard time using PF vials difficulty squeezing vials) Ok to switch to regular Refresh Optive in bottle. 3.  Pseudophakia OU - (Standard) 4. Myogenic Ptosis OU -- observe 5. H/o Uveitis OU (H/o MARIE with unknown etiology)- Quiet off drops. 6.  ERM OD - stable observe. Letter to PCP Return for an appointment in October 10 VF 24-2 OU with Dr. Giovanna Gore.

## 2018-11-20 ENCOUNTER — IMPORTED ENCOUNTER (OUTPATIENT)
Dept: URBAN - METROPOLITAN AREA CLINIC 1 | Facility: CLINIC | Age: 78
End: 2018-11-20

## 2018-11-20 PROBLEM — H40.1133: Noted: 2018-11-20

## 2018-11-20 PROBLEM — H04.123: Noted: 2018-11-20

## 2018-11-20 PROBLEM — H16.143: Noted: 2018-11-20

## 2018-11-20 PROCEDURE — 92083 EXTENDED VISUAL FIELD XM: CPT

## 2018-11-20 PROCEDURE — 92012 INTRM OPH EXAM EST PATIENT: CPT

## 2018-11-20 NOTE — PATIENT DISCUSSION
1.  Adv POAG OU (CD 0.9 OU) **Allergic to ALphagan; H/o Sulfa Reaction/ Allergy** Stable IOP OU. VF shows no progression OU. Cont Timolol 0.5% BID OU Cont Latanoprost QHS OU. Consider Rhopressa vs. ALT w/ progression. 2.  STERLING w/ PEK OU- (H/o Cautery LLs OU) Cont ATs QID OU Routinely. (Pt has a hard time using PF vials difficulty squeezing vials) Ok to switch to regular Refresh Optive in bottle. 3.  Pseudophakia OU - (Standard) 4. Myogenic Ptosis OU -- observe 5. H/o Uveitis OU (H/o MARIE with unknown etiology)- Quiet off drops. 6.  H/o ERM ODReturn for an appointment in 6 mo 27 OCT with Dr. Valorie Strickland.

## 2019-05-24 ENCOUNTER — IMPORTED ENCOUNTER (OUTPATIENT)
Dept: URBAN - METROPOLITAN AREA CLINIC 1 | Facility: CLINIC | Age: 79
End: 2019-05-24

## 2019-05-24 PROBLEM — H40.1133: Noted: 2019-05-24

## 2019-05-24 PROBLEM — H04.123: Noted: 2019-05-24

## 2019-05-24 PROBLEM — H16.143: Noted: 2019-05-24

## 2019-05-24 PROCEDURE — 92014 COMPRE OPH EXAM EST PT 1/>: CPT

## 2019-05-24 PROCEDURE — 92133 CPTRZD OPH DX IMG PST SGM ON: CPT

## 2019-05-24 NOTE — PATIENT DISCUSSION
1.  Adv POAG OU (CD 0.9 OU) **Allergic to Alphagan; H/o Sulfa Reaction/ Allergy** Stable IOP OU. OCT shows advanced thinning OU but no progression OU. Cont Timolol 0.5% BID OU Cont Latanoprost QHS OU. Consider Rhopressa vs. ALT w/ progression. 2.  STERLING w/ PEK OU- (H/o Cautery LLs OU) Cont ATs QID OU Routinely. (Pt has a hard time using PF vials difficulty squeezing vials) Ok to switch to regular Refresh Optive in bottle. 3.  Pseudophakia OU - (Standard) 4. Myogenic Ptosis OU -- observe 5. H/o Uveitis OU (H/o MARIE with unknown etiology)- Quiet off drops. 6.  ERM OD - observeLetter to PCP MRx deferred Return for an appointment in 6 mo 10 VF 24-2 OU with Dr. Giovanna Gore.

## 2019-11-26 ENCOUNTER — IMPORTED ENCOUNTER (OUTPATIENT)
Dept: URBAN - METROPOLITAN AREA CLINIC 1 | Facility: CLINIC | Age: 79
End: 2019-11-26

## 2019-11-26 PROBLEM — H40.1133: Noted: 2019-11-26

## 2019-11-26 PROCEDURE — 92083 EXTENDED VISUAL FIELD XM: CPT

## 2019-11-26 PROCEDURE — 92012 INTRM OPH EXAM EST PATIENT: CPT

## 2019-11-26 NOTE — PATIENT DISCUSSION
1.  Adv POAG OU (CD 0.9 OU) **Allergic to Alphagan; H/o Sulfa Reaction/ Allergy** Stable IOP OU. VF shows slight progression OU. Cont Timolol 0.5% BID OU Cont Latanoprost QHS OU. Consider Rhopressa vs. ALT w/ progression. 2.  STERLING w/ PEK OU- (H/o Cautery LLs OU) Cont ATs QID OU Routinely. (Pt has a hard time using PF vials difficulty squeezing vials) Ok to switch to regular Refresh Optive in bottle. 3.  Pseudophakia OU - (Standard) 4. Myogenic Ptosis OU -- observe 5. H/o Uveitis OU (H/o MARIE with unknown etiology)- Quiet off drops. 6.  H/o ERM ODReturn for an appointment in May 30/OCT with Dr. Grupo Orozco.

## 2020-07-16 ENCOUNTER — IMPORTED ENCOUNTER (OUTPATIENT)
Dept: URBAN - METROPOLITAN AREA CLINIC 1 | Facility: CLINIC | Age: 80
End: 2020-07-16

## 2020-07-16 PROBLEM — Z96.1: Noted: 2020-07-16

## 2020-07-16 PROBLEM — H16.143: Noted: 2020-07-16

## 2020-07-16 PROBLEM — H35.363: Noted: 2020-07-16

## 2020-07-16 PROBLEM — H04.123: Noted: 2020-07-16

## 2020-07-16 PROBLEM — H35.371: Noted: 2020-07-16

## 2020-07-16 PROBLEM — H02.423: Noted: 2020-07-16

## 2020-07-16 PROBLEM — H40.1133: Noted: 2020-07-16

## 2020-07-16 PROCEDURE — 92014 COMPRE OPH EXAM EST PT 1/>: CPT

## 2020-07-16 PROCEDURE — 92133 CPTRZD OPH DX IMG PST SGM ON: CPT

## 2020-07-16 NOTE — PATIENT DISCUSSION
1.  Severe Open Angle Glaucoma OU (CD 0.90 OU) - No progression by OCT. IOP stable cont Timolol BID 0.5% BID OU Patient advised to be compliant with gtts. *Consider adding Rhopressa with any future progression. **Allergic to Alphagan; H/o Sulfa Reaction/ Allergy** Condition was discussed with patient and patient understands. Will continue to monitor patient for any progression in condition. Patient was advised to call us with any problems questions or concerns. 2.  STERLING w/ PEK OU- (LL cauterized OU) Recommend ATs TID OU routinely 3. Pseudophakia OU - (Standard OU) 4.  Epiretinal Membrane OD - Observe for change. 5. Macular Drusen OU- Observe 6. Myogenic Ptosis OU - Will continue to observe at this time. 7.  H/o Uveitis OU (H/o MARIE with unknown etiology) Patient deferred Manifest Rx today. Return for an appointment in 6 months 10/HVF with Dr. Chinyere Bucio.

## 2021-01-25 ENCOUNTER — IMPORTED ENCOUNTER (OUTPATIENT)
Dept: URBAN - METROPOLITAN AREA CLINIC 1 | Facility: CLINIC | Age: 81
End: 2021-01-25

## 2021-01-25 PROBLEM — H40.1133: Noted: 2021-01-25

## 2021-01-25 PROCEDURE — 92083 EXTENDED VISUAL FIELD XM: CPT

## 2021-01-25 PROCEDURE — 92012 INTRM OPH EXAM EST PATIENT: CPT

## 2021-01-25 NOTE — PATIENT DISCUSSION
1.  Severe Open Angle Glaucoma OU (CD 0.90 OU) - HVF today non specific defects OU however essentially normal. IOP stable cont Timolol BID 0.5% BID OU and Latanoprost QHS OU. Patient advised to be compliant with gtts. *Consider adding Rhopressa with any future progression. **Allergic to Alphagan; H/o Sulfa Reaction/Allergy** 2.  STERLING w/ PEK OU- (LL cauterized OU) Recommend continue ATs TID OU routinely 3. Pseudophakia OU - (Standard OU) 4. H/o Epiretinal Membrane OD 5. H/o Macular Drusen OU 6. Myogenic Ptosis OU - Will continue to observe at this time. 7.  H/o Uveitis OU (H/o MARIE with unknown etiology)Return for an appointment in 6 months 30/OCT with Dr. Chinyere Bucio.

## 2021-10-26 ENCOUNTER — IMPORTED ENCOUNTER (OUTPATIENT)
Dept: URBAN - METROPOLITAN AREA CLINIC 1 | Facility: CLINIC | Age: 81
End: 2021-10-26

## 2021-10-26 PROBLEM — H35.371: Noted: 2021-10-26

## 2021-10-26 PROBLEM — H16.143: Noted: 2021-10-26

## 2021-10-26 PROBLEM — H40.1133: Noted: 2021-10-26

## 2021-10-26 PROBLEM — H04.123: Noted: 2021-10-26

## 2021-10-26 PROCEDURE — 92133 CPTRZD OPH DX IMG PST SGM ON: CPT

## 2021-10-26 PROCEDURE — 99214 OFFICE O/P EST MOD 30 MIN: CPT

## 2021-10-26 NOTE — PATIENT DISCUSSION
1.  Severe Open Angle Glaucoma OU (CD 0.90 OU) - No progression by OCT OU. IOP stable cont Timolol BID 0.5% BID OU and Latanoprost QHS OU. Patient advised to be compliant with gtts. *Consider adding Rhopressa with any future progression. **Allergic to Alphagan; H/o Sulfa Reaction/Allergy** 2.  ERM OD -- Stable observe. 3.  STERLING w/ PEK OU - (LL cauterized OU) Recommend continue PF ATs QID OU routinely **H/o Restasis Allergy. 4.  Pseudophakia OU - (Standard OU) 5. Macular Drusen OU -- Stable observe. 6.  Myogenic Ptosis OU - Will continue to observe at this time. 7.  H/o Uveitis OU (H/o MARIE with unknown etiology)Return for an appointment in 6 months 10/F 24-2 with Dr. Uriel Sauer.

## 2022-04-02 ASSESSMENT — VISUAL ACUITY
OD_CC: 20/30-1
OD_CC: 20/30
OD_CC: 20/30
OD_CC: 20/30-1
OS_CC: J1
OS_CC: 20/25-1
OS_CC: 20/25
OS_CC: 20/25+2
OD_CC: 20/30-1
OS_CC: 20/25
OS_CC: 20/25+1
OS_CC: 20/20
OS_CC: 20/25-2
OD_CC: J1
OS_CC: 20/25
OD_CC: 20/25
OD_CC: 20/25-2
OD_CC: 20/30

## 2022-04-02 ASSESSMENT — TONOMETRY
OD_IOP_MMHG: 11
OD_IOP_MMHG: 11
OS_IOP_MMHG: 14
OS_IOP_MMHG: 14
OS_IOP_MMHG: 15
OD_IOP_MMHG: 12
OS_IOP_MMHG: 12
OD_IOP_MMHG: 11
OD_IOP_MMHG: 14
OS_IOP_MMHG: 12
OD_IOP_MMHG: 14
OS_IOP_MMHG: 12
OS_IOP_MMHG: 15
OD_IOP_MMHG: 15
OS_IOP_MMHG: 13
OD_IOP_MMHG: 13

## 2022-06-13 ENCOUNTER — FOLLOW UP (OUTPATIENT)
Dept: URBAN - METROPOLITAN AREA CLINIC 1 | Facility: CLINIC | Age: 82
End: 2022-06-13

## 2022-06-13 DIAGNOSIS — H04.123: ICD-10-CM

## 2022-06-13 DIAGNOSIS — H16.143: ICD-10-CM

## 2022-06-13 DIAGNOSIS — H40.1133: ICD-10-CM

## 2022-06-13 PROCEDURE — 92083 EXTENDED VISUAL FIELD XM: CPT

## 2022-06-13 PROCEDURE — 99213 OFFICE O/P EST LOW 20 MIN: CPT

## 2022-06-13 ASSESSMENT — VISUAL ACUITY
OD_CC: 20/25
OS_CC: 20/25

## 2022-06-13 ASSESSMENT — TONOMETRY
OS_IOP_MMHG: 14
OD_IOP_MMHG: 16

## 2022-06-13 NOTE — PATIENT DISCUSSION
(CD 0.90 OU) - IOP stable 16/14. No significant change by HVF OU. Cont Timolol BID 0.5% BID OU and Latanoprost QHS OU. Patient advised to be compliant with gtts. *Consider adding Rhopressa with any future progression. **Allergic to Alphagan; H/o Sulfa Reaction/Allergy**.

## 2022-12-19 ENCOUNTER — COMPREHENSIVE EXAM (OUTPATIENT)
Dept: URBAN - METROPOLITAN AREA CLINIC 1 | Facility: CLINIC | Age: 82
End: 2022-12-19

## 2022-12-19 PROCEDURE — 92133 CPTRZD OPH DX IMG PST SGM ON: CPT

## 2022-12-19 PROCEDURE — 99214 OFFICE O/P EST MOD 30 MIN: CPT

## 2022-12-19 ASSESSMENT — TONOMETRY
OD_IOP_MMHG: 15
OS_IOP_MMHG: 15

## 2022-12-19 ASSESSMENT — VISUAL ACUITY
OD_SC: 20/40
OS_SC: 20/30

## 2022-12-19 NOTE — PATIENT DISCUSSION
(CD 0.90 OU) - IOP stable at 16 OD, 14 OS. OCT ON performed today shows a stable decreased RNFL OU. Advised the patient to continue the use of timolol BID OU and latanoprost QHS OU. Stressed drop compliance. *Consider adding Rhopressa with any future progression. **Allergic to Alphagan; H/o Sulfa Reaction/Allergy**.

## 2023-06-26 ENCOUNTER — FOLLOW UP (OUTPATIENT)
Dept: URBAN - METROPOLITAN AREA CLINIC 1 | Facility: CLINIC | Age: 83
End: 2023-06-26

## 2023-06-26 DIAGNOSIS — H40.1133: ICD-10-CM

## 2023-06-26 PROCEDURE — 99213 OFFICE O/P EST LOW 20 MIN: CPT

## 2023-06-26 PROCEDURE — 92083 EXTENDED VISUAL FIELD XM: CPT

## 2023-06-26 ASSESSMENT — TONOMETRY
OD_IOP_MMHG: 14
OS_IOP_MMHG: 16

## 2023-06-26 ASSESSMENT — VISUAL ACUITY
OS_SC: 20/30-2
OD_SC: 20/40-2

## 2024-02-12 ENCOUNTER — EMERGENCY VISIT (OUTPATIENT)
Dept: URBAN - METROPOLITAN AREA CLINIC 1 | Facility: CLINIC | Age: 84
End: 2024-02-12

## 2024-02-12 DIAGNOSIS — H04.123: ICD-10-CM

## 2024-02-12 DIAGNOSIS — H16.143: ICD-10-CM

## 2024-02-12 PROCEDURE — 99213 OFFICE O/P EST LOW 20 MIN: CPT

## 2024-02-12 ASSESSMENT — VISUAL ACUITY
OS_SC: 20/30-2
OD_SC: 20/40+1

## 2024-02-12 ASSESSMENT — TONOMETRY
OD_IOP_MMHG: 15
OS_IOP_MMHG: 15

## 2024-03-01 ENCOUNTER — COMPREHENSIVE EXAM (OUTPATIENT)
Dept: URBAN - METROPOLITAN AREA CLINIC 1 | Facility: CLINIC | Age: 84
End: 2024-03-01

## 2024-03-01 DIAGNOSIS — H04.123: ICD-10-CM

## 2024-03-01 DIAGNOSIS — H40.1133: ICD-10-CM

## 2024-03-01 DIAGNOSIS — H16.143: ICD-10-CM

## 2024-03-01 PROCEDURE — 99214 OFFICE O/P EST MOD 30 MIN: CPT

## 2024-03-01 PROCEDURE — 92133 CPTRZD OPH DX IMG PST SGM ON: CPT

## 2024-03-01 ASSESSMENT — TONOMETRY
OD_IOP_MMHG: 14
OS_IOP_MMHG: 14

## 2024-03-01 ASSESSMENT — VISUAL ACUITY
OD_SC: 20/30-2
OS_SC: 20/30-2
OS_CC: J2
OD_CC: J2

## 2024-07-06 NOTE — PATIENT DISCUSSION
NURSING DISCHARGE NOTE    Discharged Home via Wheelchair.  Accompanied by Spouse  Belongings Taken by patient/family.    Pt A+Ox4, no BM since last night, no BRBPR, tolerated clears for breakfast and tolerated regular diet lunch without N/V/D.  Denies any pain.  Discharge instructions reviewed with pt and wife, they verbalize understanding to stop taking Plavix but continue with ASA.  [facility] Coordinator to call pt to set up follow up appointment.  R PIV removed, dressing C/D/I.  Problem: GASTROINTESTINAL - ADULT  Goal: Maintains or returns to baseline bowel function  Description: INTERVENTIONS:  - Assess bowel function  - Maintain adequate hydration with IV or PO as ordered and tolerated  - Evaluate effectiveness of GI medications  - Encourage mobilization and activity  - Obtain nutritional consult as needed  - Establish a toileting routine/schedule  - Consider collaborating with pharmacy to review patient's medication profile  Outcome: Adequate for Discharge  Goal: Maintains adequate nutritional intake (undernourished)  Description: INTERVENTIONS:  - Monitor percentage of each meal consumed  - Identify factors contributing to decreased intake, treat as appropriate  - Assist with meals as needed  - Monitor I&O, WT and lab values  - Obtain nutritional consult as needed  - Optimize oral hygiene and moisture  - Encourage food from home; allow for food preferences  - Enhance eating environment  Outcome: Adequate for Discharge     Problem: HEMATOLOGIC - ADULT  Goal: Free from bleeding injury  Description: (Example usage: patient with low platelets)  INTERVENTIONS:  - Avoid intramuscular injections, enemas and rectal medication administration  - Ensure safe mobilization of patient  - Hold pressure on venipuncture sites to achieve adequate hemostasis  - Assess for signs and symptoms of internal bleeding  - Monitor lab trends  - Patient is to report abnormal signs of bleeding to staff  - Avoid use of toothpicks and dental  Risks, benefits, limitations, and alternatives of cataract extraction discussed with patient, including but not limited to: bleeding, infection, acute or chronic intraocular inflammation, retinal hole/tear/detachment, increased or decreased intraocular pressure, macular edema, corneal edema, posterior capsule opacification, ptosis, irregular pupil, no improvement in vision, worsened vision, need for additional surgery, and death. Patient understands the risks and wishes to proceed. floss  - Use electric shaver for shaving  - Use soft bristle tooth brush  - Limit straining and forceful nose blowing  Outcome: Adequate for Discharge

## 2024-09-05 ENCOUNTER — FOLLOW UP (OUTPATIENT)
Dept: URBAN - METROPOLITAN AREA CLINIC 1 | Facility: CLINIC | Age: 84
End: 2024-09-05

## 2024-09-05 DIAGNOSIS — H40.1133: ICD-10-CM

## 2024-09-05 PROCEDURE — 92083 EXTENDED VISUAL FIELD XM: CPT

## 2024-09-05 PROCEDURE — 99213 OFFICE O/P EST LOW 20 MIN: CPT

## 2025-03-10 ENCOUNTER — COMPREHENSIVE EXAM (OUTPATIENT)
Age: 85
End: 2025-03-10

## 2025-03-10 DIAGNOSIS — H16.143: ICD-10-CM

## 2025-03-10 DIAGNOSIS — Z96.1: ICD-10-CM

## 2025-03-10 DIAGNOSIS — H40.1133: ICD-10-CM

## 2025-03-10 DIAGNOSIS — H35.371: ICD-10-CM

## 2025-03-10 DIAGNOSIS — H04.123: ICD-10-CM

## 2025-03-10 PROCEDURE — 99214 OFFICE O/P EST MOD 30 MIN: CPT

## 2025-03-10 PROCEDURE — 92015 DETERMINE REFRACTIVE STATE: CPT

## 2025-03-10 PROCEDURE — 92133 CPTRZD OPH DX IMG PST SGM ON: CPT
